# Patient Record
Sex: MALE | Race: OTHER
[De-identification: names, ages, dates, MRNs, and addresses within clinical notes are randomized per-mention and may not be internally consistent; named-entity substitution may affect disease eponyms.]

---

## 2021-03-23 ENCOUNTER — HOSPITAL ENCOUNTER (EMERGENCY)
Dept: HOSPITAL 54 - ER | Age: 26
Discharge: HOME | End: 2021-03-23
Payer: COMMERCIAL

## 2021-03-23 VITALS — SYSTOLIC BLOOD PRESSURE: 108 MMHG | DIASTOLIC BLOOD PRESSURE: 55 MMHG

## 2021-03-23 VITALS — HEIGHT: 74 IN | BODY MASS INDEX: 19.25 KG/M2 | WEIGHT: 150 LBS

## 2021-03-23 DIAGNOSIS — Z79.899: ICD-10-CM

## 2021-03-23 DIAGNOSIS — Z88.0: ICD-10-CM
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
## 2021-03-23 DIAGNOSIS — S62.397A: Primary | ICD-10-CM

## 2021-03-23 NOTE — NUR
Patient came in to the er c/o left hand pain s/p slipped and fall in the 
bathrrom. On room air, breathing evenly and unlabored. Kept comfortable, will 
continue to monitor accordingly.